# Patient Record
Sex: FEMALE | Race: WHITE | NOT HISPANIC OR LATINO | URBAN - METROPOLITAN AREA
[De-identification: names, ages, dates, MRNs, and addresses within clinical notes are randomized per-mention and may not be internally consistent; named-entity substitution may affect disease eponyms.]

---

## 2022-09-08 ENCOUNTER — OFFICE VISIT (OUTPATIENT)
Dept: UROLOGY | Facility: CLINIC | Age: 42
End: 2022-09-08
Payer: COMMERCIAL

## 2022-09-08 VITALS
HEIGHT: 61 IN | BODY MASS INDEX: 23.41 KG/M2 | SYSTOLIC BLOOD PRESSURE: 141 MMHG | HEART RATE: 95 BPM | DIASTOLIC BLOOD PRESSURE: 74 MMHG | WEIGHT: 124 LBS

## 2022-09-08 DIAGNOSIS — N30.10 IC (INTERSTITIAL CYSTITIS): Primary | ICD-10-CM

## 2022-09-08 PROCEDURE — 99203 PR OFFICE/OUTPT VISIT, NEW, LEVL III, 30-44 MIN: ICD-10-PCS | Mod: S$GLB,,, | Performed by: UROLOGY

## 2022-09-08 PROCEDURE — 3077F PR MOST RECENT SYSTOLIC BLOOD PRESSURE >= 140 MM HG: ICD-10-PCS | Mod: CPTII,S$GLB,, | Performed by: UROLOGY

## 2022-09-08 PROCEDURE — 1159F PR MEDICATION LIST DOCUMENTED IN MEDICAL RECORD: ICD-10-PCS | Mod: CPTII,S$GLB,, | Performed by: UROLOGY

## 2022-09-08 PROCEDURE — 3077F SYST BP >= 140 MM HG: CPT | Mod: CPTII,S$GLB,, | Performed by: UROLOGY

## 2022-09-08 PROCEDURE — 3078F PR MOST RECENT DIASTOLIC BLOOD PRESSURE < 80 MM HG: ICD-10-PCS | Mod: CPTII,S$GLB,, | Performed by: UROLOGY

## 2022-09-08 PROCEDURE — 1160F PR REVIEW ALL MEDS BY PRESCRIBER/CLIN PHARMACIST DOCUMENTED: ICD-10-PCS | Mod: CPTII,S$GLB,, | Performed by: UROLOGY

## 2022-09-08 PROCEDURE — 4010F PR ACE/ARB THEARPY RXD/TAKEN: ICD-10-PCS | Mod: CPTII,S$GLB,, | Performed by: UROLOGY

## 2022-09-08 PROCEDURE — 1159F MED LIST DOCD IN RCRD: CPT | Mod: CPTII,S$GLB,, | Performed by: UROLOGY

## 2022-09-08 PROCEDURE — 3008F PR BODY MASS INDEX (BMI) DOCUMENTED: ICD-10-PCS | Mod: CPTII,S$GLB,, | Performed by: UROLOGY

## 2022-09-08 PROCEDURE — 4010F ACE/ARB THERAPY RXD/TAKEN: CPT | Mod: CPTII,S$GLB,, | Performed by: UROLOGY

## 2022-09-08 PROCEDURE — 1160F RVW MEDS BY RX/DR IN RCRD: CPT | Mod: CPTII,S$GLB,, | Performed by: UROLOGY

## 2022-09-08 PROCEDURE — 99203 OFFICE O/P NEW LOW 30 MIN: CPT | Mod: S$GLB,,, | Performed by: UROLOGY

## 2022-09-08 PROCEDURE — 3008F BODY MASS INDEX DOCD: CPT | Mod: CPTII,S$GLB,, | Performed by: UROLOGY

## 2022-09-08 PROCEDURE — 3078F DIAST BP <80 MM HG: CPT | Mod: CPTII,S$GLB,, | Performed by: UROLOGY

## 2022-09-08 RX ORDER — LEVOTHYROXINE SODIUM 100 UG/1
100 TABLET ORAL DAILY
COMMUNITY
Start: 2022-08-26

## 2022-09-08 RX ORDER — NITROFURANTOIN MACROCRYSTALS 50 MG/1
100 CAPSULE ORAL NIGHTLY
Qty: 30 CAPSULE | Refills: 11 | Status: SHIPPED | OUTPATIENT
Start: 2022-09-08 | End: 2023-07-27

## 2022-09-08 RX ORDER — AMOXICILLIN 500 MG
CAPSULE ORAL DAILY
COMMUNITY

## 2022-09-08 RX ORDER — DEXTROAMPHETAMINE SACCHARATE, AMPHETAMINE ASPARTATE, DEXTROAMPHETAMINE SULFATE AND AMPHETAMINE SULFATE 5; 5; 5; 5 MG/1; MG/1; MG/1; MG/1
TABLET ORAL
COMMUNITY
Start: 2022-08-01

## 2022-09-08 RX ORDER — BUPROPION HYDROCHLORIDE 300 MG/1
TABLET ORAL
COMMUNITY
Start: 2022-09-06

## 2022-09-08 RX ORDER — NORGESTIMATE AND ETHINYL ESTRADIOL 0.25-0.035
1 KIT ORAL DAILY
COMMUNITY
Start: 2022-08-26 | End: 2023-11-14 | Stop reason: SDUPTHER

## 2022-09-08 RX ORDER — LISINOPRIL 2.5 MG/1
2.5 TABLET ORAL DAILY
COMMUNITY
Start: 2022-07-11

## 2022-09-08 RX ORDER — MONTELUKAST SODIUM 10 MG/1
TABLET ORAL
COMMUNITY
Start: 2022-07-12

## 2022-09-08 RX ORDER — CLONAZEPAM 0.5 MG/1
TABLET ORAL
COMMUNITY
Start: 2022-05-02 | End: 2023-11-14

## 2022-09-08 NOTE — PROGRESS NOTES
Subjective:       Patient ID: Chelsea Damico is a 42 y.o. female.    Chief Complaint: IC      HPI:  42-year-old female with a long history of IC she has tried [place] cystoscopy with hydrodistention and bladder installations she was a former patient of Dr. Arti barrera per room    Past Medical History:   Past Medical History:   Diagnosis Date    Thoracic outlet syndrome        Past Surgical Historical:   Past Surgical History:   Procedure Laterality Date    APPENDECTOMY      CARPAL TUNNEL RELEASE          Medications:   Medication List with Changes/Refills   Current Medications    BUPROPION (WELLBUTRIN XL) 300 MG 24 HR TABLET        CLONAZEPAM (KLONOPIN) 0.5 MG TABLET    TAKE 1 TABLET ORALLY EVERY DAY    CRANBERRY XT/MULTIVITAMIN (CRANBERRY EXTRACT-MULTIVITAMIN ORAL)    Take by mouth.    DEXTROAMPHETAMINE-AMPHETAMINE (ADDERALL) 20 MG TABLET    TAKE 1 TABLET ORALLY TWICE DAILY    ESTARYLLA 0.25-35 MG-MCG PER TABLET    Take 1 tablet by mouth once daily.    LISINOPRIL (PRINIVIL,ZESTRIL) 2.5 MG TABLET    Take 2.5 mg by mouth once daily.    MONTELUKAST (SINGULAIR) 10 MG TABLET    TAKE 1 TABLET ORALLY EVERY DAY    OMEGA-3 FATTY ACIDS/FISH OIL (FISH OIL-OMEGA-3 FATTY ACIDS) 300-1,000 MG CAPSULE    Take by mouth once daily.    SYNTHROID 100 MCG TABLET    Take 100 mcg by mouth once daily.    VITAMIN B COMP AND VIT C NO.6 (VITAMIN B COMP WITH VIT C NO.6 ORAL)    Take by mouth.        Past Social History:   Social History     Socioeconomic History    Marital status:    Tobacco Use    Smoking status: Never    Smokeless tobacco: Never   Substance and Sexual Activity    Alcohol use: Yes       Allergies: Review of patient's allergies indicates:  No Known Allergies     Family History: History reviewed. No pertinent family history.     Review of Systems:  Review of Systems    Physical Exam:  Physical Exam    Assessment/Plan:       Problem List Items Addressed This Visit    None  Visit Diagnoses       IC (interstitial cystitis)    -   Primary               IC:   We will set the patient up for as needed bladder instillations

## 2022-10-05 ENCOUNTER — TELEPHONE (OUTPATIENT)
Dept: UROLOGY | Facility: CLINIC | Age: 42
End: 2022-10-05
Payer: COMMERCIAL

## 2022-10-07 ENCOUNTER — CLINICAL SUPPORT (OUTPATIENT)
Dept: UROLOGY | Facility: CLINIC | Age: 42
End: 2022-10-07
Payer: COMMERCIAL

## 2022-10-07 DIAGNOSIS — N30.10 IC (INTERSTITIAL CYSTITIS): Primary | ICD-10-CM

## 2022-10-07 NOTE — LETTER
October 7, 2022    Chelsea Damico  [address] [facility] [address]             [place] [facility] [place] - U[facility]  Urology  [address] [address]  Phone: [phone]  Fax: [phone]   October 7, 2022     Patient: Chelsea Damico   YOB: 1980   Date of Visit: 10/7/2022       To Whom it May Concern:    Chelsea Damico was seen in my clinic on 10/7/2022. She may return to work on 10/7/2022.    Please excuse her from any work missed.    If you have any questions or concerns, please don't hesitate to call.    Sincerely,         Kaelyn Morejon LPN

## 2022-10-07 NOTE — PROGRESS NOTES
Cleansed urethra with Hibiclense. Inserted 14 Georgian red catheter into patient's bladder and drained the bladder. Step #1 60cc Sterile H2O, (Heparin 1,000units) and Dexamethasone 8mg) instilled into bladder via catheter, slow push. Catheter held in place with plug. Patient was instructed to hold and rotate every 10 minutes for 30 minutes. After draining step #1 from bladder step #2 (DMSO irrigation 50ml mixed with 15ml 2% lidocain instilled into bladder via slow push. Catheter was discontinued. Patient was instructed to hold instillation for 20 minutes then completely empty bladder . Patient tolerated procedure without complaints. Pt verbalized understanding of post instructions.

## 2022-10-14 ENCOUNTER — CLINICAL SUPPORT (OUTPATIENT)
Dept: UROLOGY | Facility: CLINIC | Age: 42
End: 2022-10-14
Payer: COMMERCIAL

## 2022-10-14 DIAGNOSIS — N30.10 IC (INTERSTITIAL CYSTITIS): Primary | ICD-10-CM

## 2022-10-14 NOTE — LETTER
October 14, 2022      [place] ([facility]) - Urology  [address] [address]  Phone: [phone]  Fax: [phone]       Patient: Chelsea Damico   YOB: 1980  Date of Visit: 10/14/2022    To Whom It May Concern:    Vincent Damico  was at [facility] on 10/14/2022. The patient may return to work/school 10/14/2022 with no restrictions. If you have any questions or concerns, or if I can be of further assistance, please do not hesitate to contact me.    Sincerely,    Melany Palafox RN

## 2022-10-14 NOTE — PROGRESS NOTES
U/A done for possible UTI.  SCOT Mcmanus LPN discussed U/A results with pt  Will culture urine.  Decision made to continue with treatment and pt agreed.    Treatment #2 - Pt sam area cleaned with Hibiclens. 14 FR red catheter placed without difficulty.  Bladder drained of yellow/orange urine - 150cc.      Step 1 - 60 cc syringe of Heparin/Dexamethasone mix instilled slowly. Catheter plugged and secured with tape to inner thigh. Pt instructed to turn every 10 minutes.. back to left to right.  Upon completion of 30 minutes, catheter unplugged and bladder drained.    Step 2 - RIMSO/2% Lidocaine mixture instilled slowly with piston syringe.  Catheter removed with tip intact. Pt instructed to let dwell for 20 minutes then empty bladder. Pt acknowledges understanding.  Treatment #3 scheduled for next week    Pt tolerated well.

## 2022-10-16 LAB — URINE CULTURE, ROUTINE: NORMAL

## 2022-10-19 ENCOUNTER — TELEPHONE (OUTPATIENT)
Dept: UROLOGY | Facility: CLINIC | Age: 42
End: 2022-10-19
Payer: COMMERCIAL

## 2022-10-19 NOTE — TELEPHONE ENCOUNTER
LEFT VM WITH RESULTS OF NO INFECTION.    ----- Message from Blaze Lambert MD sent at 10/17/2022 10:04 AM CDT -----  Culture shows no growth. Ask pt if symptomatic.   
No pertinent past medical history

## 2022-10-21 ENCOUNTER — CLINICAL SUPPORT (OUTPATIENT)
Dept: UROLOGY | Facility: CLINIC | Age: 42
End: 2022-10-21
Payer: COMMERCIAL

## 2022-10-21 DIAGNOSIS — R82.90 ABNORMAL URINALYSIS: Primary | ICD-10-CM

## 2022-10-21 DIAGNOSIS — R82.90 ABNORMAL URINALYSIS: ICD-10-CM

## 2022-10-21 DIAGNOSIS — N30.10 IC (INTERSTITIAL CYSTITIS): Primary | ICD-10-CM

## 2022-10-21 RX ORDER — LEVOFLOXACIN 500 MG/1
500 TABLET, FILM COATED ORAL DAILY
Qty: 7 TABLET | Refills: 0 | Status: SHIPPED | OUTPATIENT
Start: 2022-10-21 | End: 2023-07-27

## 2022-10-21 NOTE — PROGRESS NOTES
Pt here for DMSO #3.  Pt states she has been urinating more and and feeling like she has a possible UTI.  U/A done.  + for UTI.  Medication called out and urine cultured.  Procedure cancelled for this week.  Rescheduled for next Friday at 0910.

## 2022-10-21 NOTE — LETTER
October 21, 2022      [place] ([facility]) - Urology  [address] [address]  Phone: [phone]  Fax: [phone]       Patient: Chelsea Damico   YOB: 1980  Date of Visit: 10/21/2022    To Whom It May Concern:    Vincent Damico  was at [facility] on 10/21/2022. The patient may return to work/school on *** {With/no:82328} restrictions. If you have any questions or concerns, or if I can be of further assistance, please do not hesitate to contact me.    Sincerely,    Blaze Lambert MD

## 2022-10-23 LAB — URINE CULTURE, ROUTINE: NORMAL

## 2022-10-26 ENCOUNTER — TELEPHONE (OUTPATIENT)
Dept: UROLOGY | Facility: CLINIC | Age: 42
End: 2022-10-26
Payer: COMMERCIAL

## 2022-10-26 NOTE — TELEPHONE ENCOUNTER
Notified patient of no growth on urine culture & to follow up as needed. Verbalized understanding.     JUDD London RN        ----- Message from Estefanía Damico NP sent at 10/25/2022  9:29 AM CDT -----  Please call the patient with the attached results. No growth found in culture. Follow up PRN.

## 2022-11-04 ENCOUNTER — CLINICAL SUPPORT (OUTPATIENT)
Dept: UROLOGY | Facility: CLINIC | Age: 42
End: 2022-11-04
Payer: COMMERCIAL

## 2022-11-04 NOTE — PROGRESS NOTES
Pt here for DMSO treatment #4.  Pt denies any signs/symptoms of UTI.  Pt sam area cleaned with Hibiclens.  14 FR red catheter placed without difficulty.  Bladder drained of 200 cc yellow urine.  Step 1:  Verification from pt of correct name on syringe.  60 CC syringe of Heparin/Dexamethasone mix instilled slowly.  Catheter plugged and secured with tape to inner left thigh. Pt instructed to change position from back to left side to right side every 10 minutes. Upon completion of 30 minutes, catheter plug removed and bladder drained.  Step 2: RIMSO/2% Lidocaine gel mixture mixed and slowly instilled via catheter with piston syringe.  Catheter removed with tip in tact. Pt instructed to let mixture dwell for 20 minutes but no longer than 30 minutes then empty bladder.  Pt acknowledges understanding. Treatment #5 scheduled for next week.

## 2022-11-04 NOTE — LETTER
November 4, 2022      [place] ([facility]) - Urology  [address] [address]  Phone: [phone]  Fax: [phone]       Patient: Chelsea Damico   YOB: 1980  Date of Visit: 11/04/2022    To Whom It May Concern:    Vincent Damico  was at [facility] on 11/04/2022. The patient may return to work/school on 11/04/2022 with no restrictions. If you have any questions or concerns, or if I can be of further assistance, please do not hesitate to contact me.    Sincerely,    Melany Palafox RN

## 2022-11-18 ENCOUNTER — CLINICAL SUPPORT (OUTPATIENT)
Dept: UROLOGY | Facility: CLINIC | Age: 42
End: 2022-11-18
Payer: COMMERCIAL

## 2022-11-18 NOTE — LETTER
November 18, 2022      [place] ([facility]) - Urology  [address] [address]  Phone: [phone]  Fax: [phone]       Patient: Chelsea Damico   YOB: 1980  Date of Visit: 11/18/2022    To Whom It May Concern:    Vincent Damico  was at [facility] on 11/18/2022. The patient may return to work/school on 11/18/2022 with no restrictions. If you have any questions or concerns, or if I can be of further assistance, please do not hesitate to contact me.    Sincerely,    Melany Palafox RN

## 2022-11-18 NOTE — PROGRESS NOTES
Pt here for DMSO treatment #5.  Pt denies any signs/symptoms of UTI.  Pt sam area cleaned with Hibiclens.  14 FR red catheter placed without difficulty.  Bladder drained of 225 cc yellow urine.  Step 1:  Verification from pt of correct name on syringe.  60 CC syringe of Heparin/Dexamethasone mix instilled slowly.  Catheter plugged and secured with tape to inner left thigh. Pt instructed to change position from back to left side to right side every 10 minutes. Upon completion of 30 minutes, catheter plug removed and bladder drained.  Step 2: RIMSO/2% Lidocaine gel mixture mixed and slowly instilled via catheter with piston syringe.  Catheter removed with tip in tact. Pt instructed to let mixture dwell for 20 minutes but no longer than 30 minutes then empty bladder.  Pt acknowledges understanding. Treatment #6 scheduled for week after next.

## 2022-12-02 ENCOUNTER — CLINICAL SUPPORT (OUTPATIENT)
Dept: UROLOGY | Facility: CLINIC | Age: 42
End: 2022-12-02
Payer: COMMERCIAL

## 2022-12-02 NOTE — LETTER
December 2, 2022      [place] ([facility]) - Urology  [address] [address]  Phone: [phone]  Fax: [phone]       Patient: Chelsea Damico   YOB: 1980  Date of Visit: 12/02/2022    To Whom It May Concern:    Vincent Damico  was at [facility] on 12/02/2022. The patient may return to work/school on 12/2/2022 with no  restrictions. If you have any questions or concerns, or if I can be of further assistance, please do not hesitate to contact me.    Sincerely,    Megan London RN

## 2022-12-02 NOTE — PROGRESS NOTES
Patient here for DMSO treatment # 6. Patient denies any signs/symptoms of a UTI. Anjali-area cleansed with hibclens. 14 fr red catheter inserted without difficulty. Bladder drained of 300 cc yellow urine.    Step 1: Verification from patient of correct name &  on syringe. 60 cc syringe of heparin/dexamethasone mix instilled slowly into bladder. Catheter plugged & secured to left inner thigh with tape. Patient instructed to change position from back to left to right side every 10 minutes. Patient verbalized understanding. Upon completion of 30 minutes dwell time, catheter plug removed & bladder drained.     Step 2: RIMSO 2% Lidocaine gel 5% mixture mixed & slowly instilled via catheter with piston syringe. Catheter removed with tip intact. Patient instructed to allow mixture to dwell for 20 minutes but no longer than 30 minutes then empty bladder. Patient verbalized understanding. DMSO treatments complete.       JUDD London RN

## 2023-04-14 ENCOUNTER — TELEPHONE (OUTPATIENT)
Dept: OBSTETRICS AND GYNECOLOGY | Facility: CLINIC | Age: 43
End: 2023-04-14
Payer: COMMERCIAL

## 2023-05-10 ENCOUNTER — TELEPHONE (OUTPATIENT)
Dept: OBSTETRICS AND GYNECOLOGY | Facility: CLINIC | Age: 43
End: 2023-05-10
Payer: COMMERCIAL

## 2023-05-17 ENCOUNTER — TELEPHONE (OUTPATIENT)
Dept: OBSTETRICS AND GYNECOLOGY | Facility: CLINIC | Age: 43
End: 2023-05-17
Payer: COMMERCIAL

## 2023-07-20 DIAGNOSIS — Z01.419 ENCOUNTER FOR ANNUAL ROUTINE GYNECOLOGICAL EXAMINATION: Primary | ICD-10-CM

## 2023-07-26 DIAGNOSIS — N30.10 IC (INTERSTITIAL CYSTITIS): ICD-10-CM

## 2023-07-27 RX ORDER — NITROFURANTOIN MACROCRYSTALS 50 MG/1
100 CAPSULE ORAL NIGHTLY
Qty: 30 CAPSULE | Refills: 1 | Status: SHIPPED | OUTPATIENT
Start: 2023-07-27 | End: 2023-08-15 | Stop reason: SDUPTHER

## 2023-08-15 DIAGNOSIS — N30.10 IC (INTERSTITIAL CYSTITIS): ICD-10-CM

## 2023-08-15 RX ORDER — NITROFURANTOIN MACROCRYSTALS 50 MG/1
100 CAPSULE ORAL NIGHTLY
Qty: 30 CAPSULE | Refills: 1 | Status: SHIPPED | OUTPATIENT
Start: 2023-08-15 | End: 2023-11-14

## 2023-09-01 ENCOUNTER — PATIENT MESSAGE (OUTPATIENT)
Dept: OBSTETRICS AND GYNECOLOGY | Facility: CLINIC | Age: 43
End: 2023-09-01
Payer: COMMERCIAL

## 2023-09-20 ENCOUNTER — OFFICE VISIT (OUTPATIENT)
Dept: UROLOGY | Facility: CLINIC | Age: 43
End: 2023-09-20
Payer: COMMERCIAL

## 2023-09-20 DIAGNOSIS — N30.10 INTERSTITIAL CYSTITIS: Primary | ICD-10-CM

## 2023-09-20 LAB
BILIRUBIN, UA POC OHS: NEGATIVE
BLOOD, UA POC OHS: NEGATIVE
CLARITY, UA POC OHS: CLEAR
COLOR, UA POC OHS: YELLOW
GLUCOSE, UA POC OHS: NEGATIVE
KETONES, UA POC OHS: NEGATIVE
LEUKOCYTES, UA POC OHS: NEGATIVE
NITRITE, UA POC OHS: NEGATIVE
PH, UA POC OHS: 6
PROTEIN, UA POC OHS: NEGATIVE
SPECIFIC GRAVITY, UA POC OHS: 1.02
UROBILINOGEN, UA POC OHS: 0.2

## 2023-09-20 PROCEDURE — 81003 POCT URINALYSIS(INSTRUMENT): ICD-10-PCS | Mod: QW,S$GLB,, | Performed by: UROLOGY

## 2023-09-20 PROCEDURE — 1159F MED LIST DOCD IN RCRD: CPT | Mod: CPTII,S$GLB,, | Performed by: UROLOGY

## 2023-09-20 PROCEDURE — 99214 PR OFFICE/OUTPT VISIT, EST, LEVL IV, 30-39 MIN: ICD-10-PCS | Mod: S$GLB,,, | Performed by: UROLOGY

## 2023-09-20 PROCEDURE — 4010F PR ACE/ARB THEARPY RXD/TAKEN: ICD-10-PCS | Mod: CPTII,S$GLB,, | Performed by: UROLOGY

## 2023-09-20 PROCEDURE — 81003 URINALYSIS AUTO W/O SCOPE: CPT | Mod: QW,S$GLB,, | Performed by: UROLOGY

## 2023-09-20 PROCEDURE — 1159F PR MEDICATION LIST DOCUMENTED IN MEDICAL RECORD: ICD-10-PCS | Mod: CPTII,S$GLB,, | Performed by: UROLOGY

## 2023-09-20 PROCEDURE — 99214 OFFICE O/P EST MOD 30 MIN: CPT | Mod: S$GLB,,, | Performed by: UROLOGY

## 2023-09-20 PROCEDURE — 1160F PR REVIEW ALL MEDS BY PRESCRIBER/CLIN PHARMACIST DOCUMENTED: ICD-10-PCS | Mod: CPTII,S$GLB,, | Performed by: UROLOGY

## 2023-09-20 PROCEDURE — 1160F RVW MEDS BY RX/DR IN RCRD: CPT | Mod: CPTII,S$GLB,, | Performed by: UROLOGY

## 2023-09-20 PROCEDURE — 4010F ACE/ARB THERAPY RXD/TAKEN: CPT | Mod: CPTII,S$GLB,, | Performed by: UROLOGY

## 2023-09-20 RX ORDER — NITROFURANTOIN 25; 75 MG/1; MG/1
100 CAPSULE ORAL DAILY
Qty: 30 CAPSULE | Refills: 11 | Status: SHIPPED | OUTPATIENT
Start: 2023-09-20

## 2023-09-20 NOTE — PROGRESS NOTES
Subjective:       Patient ID: Chelsea Damico is a 43 y.o. female.    Chief Complaint: No chief complaint on file.      HPI:  43-year-old female with history of interstitial cystitis which has recurrent she is done fairly well for the past year but currently has some flare-ups what she feels related potentially to stress    Past Medical History:   Past Medical History:   Diagnosis Date    Thoracic outlet syndrome        Past Surgical Historical:   Past Surgical History:   Procedure Laterality Date    APPENDECTOMY      CARPAL TUNNEL RELEASE          Medications:   Medication List with Changes/Refills   Current Medications    BUPROPION (WELLBUTRIN XL) 300 MG 24 HR TABLET        CLONAZEPAM (KLONOPIN) 0.5 MG TABLET    TAKE 1 TABLET ORALLY EVERY DAY    CRANBERRY XT/MULTIVITAMIN (CRANBERRY EXTRACT-MULTIVITAMIN ORAL)    Take by mouth.    DEXTROAMPHETAMINE-AMPHETAMINE (ADDERALL) 20 MG TABLET    TAKE 1 TABLET ORALLY TWICE DAILY    ESTARYLLA 0.25-35 MG-MCG PER TABLET    Take 1 tablet by mouth once daily.    LISINOPRIL (PRINIVIL,ZESTRIL) 2.5 MG TABLET    Take 2.5 mg by mouth once daily.    MONTELUKAST (SINGULAIR) 10 MG TABLET    TAKE 1 TABLET ORALLY EVERY DAY    NITROFURANTOIN (MACRODANTIN) 50 MG CAPSULE    Take 2 capsules (100 mg total) by mouth nightly.    OMEGA-3 FATTY ACIDS/FISH OIL (FISH OIL-OMEGA-3 FATTY ACIDS) 300-1,000 MG CAPSULE    Take by mouth once daily.    SYNTHROID 100 MCG TABLET    Take 100 mcg by mouth once daily.    VITAMIN B COMP AND VIT C NO.6 (VITAMIN B COMP WITH VIT C NO.6 ORAL)    Take by mouth.        Past Social History:   Social History     Socioeconomic History    Marital status:    Tobacco Use    Smoking status: Never    Smokeless tobacco: Never   Substance and Sexual Activity    Alcohol use: Yes       Allergies: Review of patient's allergies indicates:  No Known Allergies     Family History: History reviewed. No pertinent family history.     Review of Systems:  Review of Systems   Constitutional:   Negative for activity change and appetite change.   HENT:  Negative for congestion and dental problem.    Eyes:  Negative for pain and discharge.   Respiratory:  Negative for chest tightness and shortness of breath.    Cardiovascular:  Negative for chest pain.   Gastrointestinal:  Negative for abdominal distention and abdominal pain.   Endocrine: Negative for cold intolerance, heat intolerance and polyuria.   Genitourinary:  Negative for decreased urine volume, difficulty urinating, dyspareunia, dysuria, enuresis, flank pain, frequency, genital sores, hematuria, menstrual problem, pelvic pain, urgency, vaginal bleeding, vaginal discharge and vaginal pain.   Musculoskeletal:  Negative for back pain and neck pain.   Skin:  Negative for color change and rash.   Allergic/Immunologic: Negative for immunocompromised state.   Neurological:  Negative for dizziness.   Hematological:  Negative for adenopathy.   Psychiatric/Behavioral:  Negative for agitation, behavioral problems and confusion.        Physical Exam:  Physical Exam  Constitutional:       Appearance: She is well-developed.   HENT:      Head: Normocephalic and atraumatic.      Right Ear: External ear normal.      Left Ear: External ear normal.   Eyes:      Conjunctiva/sclera: Conjunctivae normal.   Neck:      Vascular: No JVD.   Cardiovascular:      Rate and Rhythm: Normal rate and regular rhythm.   Pulmonary:      Effort: Pulmonary effort is normal. No respiratory distress.      Breath sounds: Normal breath sounds. No wheezing.   Abdominal:      General: There is no distension.      Palpations: Abdomen is soft.      Tenderness: There is no abdominal tenderness. There is no rebound.   Musculoskeletal:         General: Normal range of motion.      Cervical back: Normal range of motion.   Skin:     General: Skin is warm and dry.      Findings: No erythema or rash.   Neurological:      Mental Status: She is alert and oriented to person, place, and time.    Psychiatric:         Behavior: Behavior normal.         Assessment/Plan:       Problem List Items Addressed This Visit    None  Visit Diagnoses       Interstitial cystitis    -  Primary               Interstitial cystitis and recurrent UTIs:   We will refill the patient's nitrofurantoin 100 mg we will get her scheduled for DMSO instillation

## 2023-10-06 ENCOUNTER — CLINICAL SUPPORT (OUTPATIENT)
Dept: UROLOGY | Facility: CLINIC | Age: 43
End: 2023-10-06
Payer: COMMERCIAL

## 2023-10-06 DIAGNOSIS — N30.10 INTERSTITIAL CYSTITIS: Primary | ICD-10-CM

## 2023-10-06 PROCEDURE — 51700 PR IRRIGATION, BLADDER: ICD-10-PCS | Mod: S$GLB,,, | Performed by: UROLOGY

## 2023-10-06 PROCEDURE — 51700 IRRIGATION OF BLADDER: CPT | Mod: S$GLB,,, | Performed by: UROLOGY

## 2023-10-06 NOTE — PROGRESS NOTES
Pt here for DMSO treatment #1.  Pt denies any signs/symptoms of UTI.  Pt sam area cleaned with Hibiclens.  14 FR red catheter placed without difficulty.  Bladder drained of 75 cc yellow urine.  Step 1:  Verification from pt of correct name on syringe.  60 CC syringe of Heparin/Dexamethasone mix instilled slowly.  Catheter plugged and secured with tape to inner left thigh. Pt instructed to change position from back to left side to right side every 10 minutes. Upon completion of 30 minutes, catheter plug removed and bladder drained.  Step 2: RIMSO/2% Lidocaine gel mixture mixed and slowly instilled via catheter with piston syringe.  Catheter removed with tip in tact. Pt instructed to let mixture dwell for 20 minutes but no longer than 30 minutes then empty bladder.  Pt acknowledges understanding. Treatment #2 scheduled for next week.

## 2023-10-11 ENCOUNTER — TELEPHONE (OUTPATIENT)
Dept: UROLOGY | Facility: CLINIC | Age: 43
End: 2023-10-11
Payer: COMMERCIAL

## 2023-10-11 NOTE — TELEPHONE ENCOUNTER
----- Message from Yessica Mcmanus LPN sent at 10/11/2023  1:16 PM CDT -----  Contact: Chelsea    ----- Message -----  From: Serene Palafox MA  Sent: 10/11/2023  10:11 AM CDT  To: Yessica Mcmanus LPN; Melany Palafox RN      ----- Message -----  From: Betzaida Gomez  Sent: 10/11/2023  10:03 AM CDT  To: Fausto Thakur Staff    Pt is calling inm regards to getting her appt on 10/13 rescheduled to next Friday 10/20 same time.please call back at  [phone]                 Thanks  PAZ

## 2023-10-20 ENCOUNTER — CLINICAL SUPPORT (OUTPATIENT)
Dept: UROLOGY | Facility: CLINIC | Age: 43
End: 2023-10-20
Payer: COMMERCIAL

## 2023-10-20 DIAGNOSIS — N30.10 INTERSTITIAL CYSTITIS: Primary | ICD-10-CM

## 2023-10-20 PROCEDURE — 96372 PR INJECTION,THERAP/PROPH/DIAG2ST, IM OR SUBCUT: ICD-10-PCS | Mod: S$GLB,,, | Performed by: UROLOGY

## 2023-10-20 PROCEDURE — 96372 THER/PROPH/DIAG INJ SC/IM: CPT | Mod: S$GLB,,, | Performed by: UROLOGY

## 2023-10-20 NOTE — PROGRESS NOTES
Patient here for DMSO treatment # 2. Assisted patient onto exam table & feet placed in stirrups.   STEP 1: Anjali area cleansed using aseptic technique. 14 fr red in & out catheter inserted into urethra without difficulty. Immediate return of 150 cc of dark yellowish, orange urine to cylinder. Heparin 1000 units/Dexamethasone 8 mg mixture instilled into bladder via catheter slow push. Patient tolerated well. Catheter plugged. Instructed patient to turn from back to left to right every 10 minutes for a total of 30 minutes. Verbalized understanding.     STEP 2 : Catheter untaped & unplugged, bladder drained of 100 cc of yellowish-orange urine to cylinder. RIMSO 50 cc solution instilled into bladder via catheter slow push. 14 fr catheter removed with tip intact. Patient tolerated well. Instructed patient to allow medication to dwell in bladder for 20 minutes no longer than 30 minutes then empty bladder. Verbalized understanding.

## 2023-10-27 ENCOUNTER — CLINICAL SUPPORT (OUTPATIENT)
Dept: UROLOGY | Facility: CLINIC | Age: 43
End: 2023-10-27
Payer: COMMERCIAL

## 2023-10-27 DIAGNOSIS — N30.10 INTERSTITIAL CYSTITIS: Primary | ICD-10-CM

## 2023-10-27 PROCEDURE — 51700 IRRIGATION OF BLADDER: CPT | Mod: S$GLB,,, | Performed by: UROLOGY

## 2023-10-27 PROCEDURE — 51700 PR IRRIGATION, BLADDER: ICD-10-PCS | Mod: S$GLB,,, | Performed by: UROLOGY

## 2023-10-27 NOTE — PROGRESS NOTES
Pt here for DMSO treatment #3.  Pt denies any signs/symptoms of UTI.  Pt sam area cleaned with Hibiclens.  14 FR red catheter placed without difficulty.  Bladder drained of 50 cc bright yellow urine.  Step 1:  Verification from pt of correct name on syringe.  60 CC syringe of Heparin/Dexamethasone mix instilled slowly.  Catheter plugged and secured with tape to inner left thigh. Pt instructed to change position from back to left side to right side every 10 minutes. Upon completion of 30 minutes, catheter plug removed and bladder drained.  Step 2: RIMSO/2% Lidocaine gel mixture mixed and slowly instilled via catheter with piston syringe.  Catheter removed with tip in tact. Pt instructed to let mixture dwell for 20 minutes but no longer than 30 minutes then empty bladder.  Pt acknowledges understanding. Treatment #4 scheduled for 2 weeks

## 2023-11-10 ENCOUNTER — CLINICAL SUPPORT (OUTPATIENT)
Dept: UROLOGY | Facility: CLINIC | Age: 43
End: 2023-11-10
Payer: COMMERCIAL

## 2023-11-10 DIAGNOSIS — N30.10 INTERSTITIAL CYSTITIS: Primary | ICD-10-CM

## 2023-11-10 PROCEDURE — 51700 IRRIGATION OF BLADDER: CPT | Mod: S$GLB,,, | Performed by: UROLOGY

## 2023-11-10 PROCEDURE — 51700 PR IRRIGATION, BLADDER: ICD-10-PCS | Mod: S$GLB,,, | Performed by: UROLOGY

## 2023-11-10 NOTE — PROGRESS NOTES
Pt here for DMSO treatment #4.  Pt denies any signs/symptoms of UTI.  Pt sam area cleaned with Hibiclens.  14 FR red catheter placed without difficulty.  Bladder drained of 100 cc clear yellow urine.  Step 1:  Verification from pt of correct name on syringe.  60 CC syringe of Heparin/Dexamethasone mix instilled slowly.  Catheter plugged and secured with tape to inner right thigh. Pt instructed to change position from back to left side to right side every 10 minutes. Upon completion of 30 minutes, catheter plug removed and bladder drained of 125 cc yellow urine.  Step 2: RIMSO/2% Lidocaine gel mixture mixed and slowly instilled via catheter with piston syringe.  Catheter removed with tip in tact. Pt instructed to let mixture dwell for 20 minutes but no longer than 30 minutes then empty bladder.  Pt acknowledges understanding. Treatment #5 appt made for 1 week from today.

## 2023-11-14 ENCOUNTER — TELEPHONE (OUTPATIENT)
Dept: UROLOGY | Facility: CLINIC | Age: 43
End: 2023-11-14
Payer: COMMERCIAL

## 2023-11-14 ENCOUNTER — OFFICE VISIT (OUTPATIENT)
Dept: OBSTETRICS AND GYNECOLOGY | Facility: CLINIC | Age: 43
End: 2023-11-14
Payer: COMMERCIAL

## 2023-11-14 VITALS
BODY MASS INDEX: 25.04 KG/M2 | DIASTOLIC BLOOD PRESSURE: 98 MMHG | SYSTOLIC BLOOD PRESSURE: 156 MMHG | HEART RATE: 112 BPM | WEIGHT: 132.63 LBS | HEIGHT: 61 IN

## 2023-11-14 DIAGNOSIS — Z00.00 ENCOUNTER FOR WELLNESS EXAMINATION: Primary | ICD-10-CM

## 2023-11-14 DIAGNOSIS — N30.10 INTERSTITIAL CYSTITIS: Primary | ICD-10-CM

## 2023-11-14 DIAGNOSIS — R19.00 PELVIC FULLNESS IN FEMALE: ICD-10-CM

## 2023-11-14 PROCEDURE — 1159F MED LIST DOCD IN RCRD: CPT | Mod: CPTII,S$GLB,, | Performed by: OBSTETRICS & GYNECOLOGY

## 2023-11-14 PROCEDURE — 3008F PR BODY MASS INDEX (BMI) DOCUMENTED: ICD-10-PCS | Mod: CPTII,S$GLB,, | Performed by: OBSTETRICS & GYNECOLOGY

## 2023-11-14 PROCEDURE — 3008F BODY MASS INDEX DOCD: CPT | Mod: CPTII,S$GLB,, | Performed by: OBSTETRICS & GYNECOLOGY

## 2023-11-14 PROCEDURE — 3080F DIAST BP >= 90 MM HG: CPT | Mod: CPTII,S$GLB,, | Performed by: OBSTETRICS & GYNECOLOGY

## 2023-11-14 PROCEDURE — 3080F PR MOST RECENT DIASTOLIC BLOOD PRESSURE >= 90 MM HG: ICD-10-PCS | Mod: CPTII,S$GLB,, | Performed by: OBSTETRICS & GYNECOLOGY

## 2023-11-14 PROCEDURE — 4010F PR ACE/ARB THEARPY RXD/TAKEN: ICD-10-PCS | Mod: CPTII,S$GLB,, | Performed by: OBSTETRICS & GYNECOLOGY

## 2023-11-14 PROCEDURE — 4010F ACE/ARB THERAPY RXD/TAKEN: CPT | Mod: CPTII,S$GLB,, | Performed by: OBSTETRICS & GYNECOLOGY

## 2023-11-14 PROCEDURE — 1159F PR MEDICATION LIST DOCUMENTED IN MEDICAL RECORD: ICD-10-PCS | Mod: CPTII,S$GLB,, | Performed by: OBSTETRICS & GYNECOLOGY

## 2023-11-14 PROCEDURE — 3077F PR MOST RECENT SYSTOLIC BLOOD PRESSURE >= 140 MM HG: ICD-10-PCS | Mod: CPTII,S$GLB,, | Performed by: OBSTETRICS & GYNECOLOGY

## 2023-11-14 PROCEDURE — 99386 PR PREVENTIVE VISIT,NEW,40-64: ICD-10-PCS | Mod: S$GLB,,, | Performed by: OBSTETRICS & GYNECOLOGY

## 2023-11-14 PROCEDURE — 99386 PREV VISIT NEW AGE 40-64: CPT | Mod: S$GLB,,, | Performed by: OBSTETRICS & GYNECOLOGY

## 2023-11-14 PROCEDURE — 3077F SYST BP >= 140 MM HG: CPT | Mod: CPTII,S$GLB,, | Performed by: OBSTETRICS & GYNECOLOGY

## 2023-11-14 RX ORDER — NORGESTIMATE AND ETHINYL ESTRADIOL 0.25-0.035
1 KIT ORAL DAILY
Qty: 84 TABLET | Refills: 3 | Status: SHIPPED | OUTPATIENT
Start: 2023-11-14

## 2023-11-14 NOTE — TELEPHONE ENCOUNTER
----- Message from Mary Beth Garcia sent at 11/14/2023  9:23 AM CST -----  Contact: self  Type: Staff Message  Caller: Chelsea Damico  Call Back Number: [phone]  Nature of the Call: pt has a  UTI and needing to cancel apt  Additional Information: na

## 2023-11-14 NOTE — PROGRESS NOTES
Subjective:      Patient ID: Chelsea Damico is a 43 y.o. female.    Chief Complaint:  Well Woman      History of Present Illness  Gynecologic Exam  The patient's primary symptoms include pelvic pain. The patient's pertinent negatives include no genital itching, genital lesions, genital odor, genital rash, missed menses, vaginal bleeding or vaginal discharge. She is not pregnant. Associated symptoms include headaches. Pertinent negatives include no abdominal pain, anorexia, back pain, chills, constipation, diarrhea, discolored urine, dysuria, fever, flank pain, frequency, hematuria, nausea, painful intercourse, rash, urgency or vomiting. There has been no bleeding. She has not been passing clots. She has not been passing tissue. She is sexually active. No, her partner does not have an STD. She uses oral contraceptives for contraception. Her menstrual history has been irregular. Her past medical history is significant for an abdominal surgery, endometriosis, a gynecological surgery, metrorrhagia and ovarian cysts. There is no history of a  section, an ectopic pregnancy, herpes simplex, menorrhagia, miscarriage, perineal abscess, PID, an STD, a terminated pregnancy or vaginosis.     This is a 43 year old female here for her annual exam and has no additional complaints      GYN & OB History  No LMP recorded (lmp unknown).   Date of Last Pap: No result found    OB History    Para Term  AB Living   2       2     SAB IAB Ectopic Multiple Live Births   2              # Outcome Date GA Lbr Jaron/2nd Weight Sex Delivery Anes PTL Lv   2 2005           1 2003               Review of Systems  Review of Systems   Constitutional:  Negative for chills and fever.   Gastrointestinal:  Negative for abdominal pain, anorexia, constipation, diarrhea, nausea and vomiting.   Genitourinary:  Positive for pelvic pain. Negative for dysuria, flank pain, frequency, hematuria, menorrhagia, missed menses, urgency and  vaginal discharge.   Musculoskeletal:  Negative for back pain.   Integumentary:  Negative for rash.   Neurological:  Positive for headaches.          Objective:     Physical Exam:   Constitutional: She appears well-developed and well-nourished.      Neck: No thyroid mass and no thyromegaly present.     Pulmonary/Chest: Chest wall is not dull to percussion. She exhibits no mass, no tenderness, no bony tenderness, no laceration, no crepitus, no edema, no deformity, no swelling and no retraction. Right breast exhibits no inverted nipple, no mass, no nipple discharge, no skin change, no tenderness, presence, no bleeding and no swelling. Left breast exhibits no inverted nipple, no mass, no nipple discharge, no skin change, no tenderness, presence, no bleeding and no swelling.        Abdominal: Soft. Bowel sounds are normal. There is no abdominal tenderness. Hernia confirmed negative in the right inguinal area and confirmed negative in the left inguinal area.     Genitourinary:    Vagina and uterus normal.      Pelvic exam was performed with patient supine.   Labial bartholins normal.There is no rash, tenderness, lesion or injury on the right labia. There is no rash, tenderness, lesion or injury on the left labia. Cervix is normal. Right adnexum displays fullness. Right adnexum displays no mass and no tenderness. Left adnexum displays fullness. Left adnexum displays no mass and no tenderness. No rectocele, cystocele or unspecified prolapse of vaginal walls in the vagina.                Skin: Skin is warm and dry.    Psychiatric: She has a normal mood and affect. Her speech is normal and behavior is normal.    Chaperone present        Assessment:     1. Encounter for wellness examination    2. Pelvic fullness in female              Plan:     Encounter for wellness examination    Pelvic fullness in female  -     Cancel: US OB/GYN Procedure ([facility]); Future  -     US Pelvis Comp with Transvag NON-OB (xpd; Future; Expected  date: 11/14/2023    Other orders  -     ESTARYLLA 0.25-35 mg-mcg per tablet; Take 1 tablet by mouth once daily.  Dispense: 84 tablet; Refill: 3

## 2023-11-16 ENCOUNTER — PATIENT MESSAGE (OUTPATIENT)
Dept: OBSTETRICS AND GYNECOLOGY | Facility: CLINIC | Age: 43
End: 2023-11-16
Payer: COMMERCIAL

## 2023-11-17 ENCOUNTER — CLINICAL SUPPORT (OUTPATIENT)
Dept: UROLOGY | Facility: CLINIC | Age: 43
End: 2023-11-17
Payer: COMMERCIAL

## 2023-11-17 DIAGNOSIS — N30.10 IC (INTERSTITIAL CYSTITIS): Primary | ICD-10-CM

## 2023-11-17 DIAGNOSIS — R30.0 DYSURIA: ICD-10-CM

## 2023-11-17 PROCEDURE — 51700 IRRIGATION OF BLADDER: CPT | Mod: S$GLB,,, | Performed by: UROLOGY

## 2023-11-17 PROCEDURE — 81003 POCT URINALYSIS(INSTRUMENT): ICD-10-PCS | Mod: QW,S$GLB,, | Performed by: UROLOGY

## 2023-11-17 PROCEDURE — 51700 PR IRRIGATION, BLADDER: ICD-10-PCS | Mod: S$GLB,,, | Performed by: UROLOGY

## 2023-11-17 PROCEDURE — 81003 URINALYSIS AUTO W/O SCOPE: CPT | Mod: QW,S$GLB,, | Performed by: UROLOGY

## 2023-11-17 NOTE — LETTER
[place] - [facility]rology  [address] [address]  Phone: [phone]  Fax: [phone]      2023      Chelsea Damico   1980    To whom it may concern:    Chelsea Damico was at [facility] today for a treatment. Please excuse her from work. She may return to work 23 when treatment is completed without any restrictions.  Feel free to contact us with any questions.    Thank you,      LILIA Camacho  Office Of Dr. Blaze Lambert

## 2023-11-17 NOTE — PROGRESS NOTES
Pt reports she thought she had an uti, saw ob/gyn doctor recently and they were going to check it. She had not heard back from anyone. Nothing found in chart.  Using aseptic technique, room and pt prepared for RIMSO treatment but I will get ua first. Apox 70cc orange fluid drained from bladder with 30 in ua cup.    UA came back with no bacteria nor any jayshree, but wbc 0-2. Will proceed with treatment.    Drained apox another 35cc orange fluid from bladder and instilled Heparin 1000 units/Dexamethasone 8mg in 60cc sterile compound solution. Time noted 2:12PM. Pt knows to turn q10 minutes. I will return and check on pt before draining part 1.    Checked on pt about 15 minutes into treatment, doing well. She requested work excuse and schedule next treatment. Done.    At 2:42PM I was in room with pt, drained part 1 then instilled the 50ml RIMSO lot # [id number] exp Sep 2025. 14F ramires removed. Pt tolerated well. After pt dressed she was given printed appointment and work excuse. Room cleaned.

## 2023-11-20 LAB
BILIRUBIN, UA POC OHS: NEGATIVE
BLOOD, UA POC OHS: NEGATIVE
CLARITY, UA POC OHS: CLEAR
COLOR, UA POC OHS: YELLOW
GLUCOSE, UA POC OHS: 100
KETONES, UA POC OHS: NEGATIVE
LEUKOCYTES, UA POC OHS: NEGATIVE
NITRITE, UA POC OHS: POSITIVE
PH, UA POC OHS: 6.5
PROTEIN, UA POC OHS: NEGATIVE
SPECIFIC GRAVITY, UA POC OHS: 1.01
UROBILINOGEN, UA POC OHS: 1

## 2023-11-21 ENCOUNTER — PATIENT MESSAGE (OUTPATIENT)
Dept: OBSTETRICS AND GYNECOLOGY | Facility: CLINIC | Age: 43
End: 2023-11-21
Payer: COMMERCIAL

## 2023-11-28 ENCOUNTER — TELEPHONE (OUTPATIENT)
Dept: UROLOGY | Facility: CLINIC | Age: 43
End: 2023-11-28
Payer: COMMERCIAL

## 2023-12-01 ENCOUNTER — CLINICAL SUPPORT (OUTPATIENT)
Dept: UROLOGY | Facility: CLINIC | Age: 43
End: 2023-12-01
Payer: COMMERCIAL

## 2023-12-01 DIAGNOSIS — N30.10 IC (INTERSTITIAL CYSTITIS): Primary | ICD-10-CM

## 2023-12-01 LAB
BILIRUBIN, UA POC OHS: NEGATIVE
BLOOD, UA POC OHS: NEGATIVE
CLARITY, UA POC OHS: CLEAR
COLOR, UA POC OHS: YELLOW
GLUCOSE, UA POC OHS: NEGATIVE
KETONES, UA POC OHS: NEGATIVE
LEUKOCYTES, UA POC OHS: NEGATIVE
NITRITE, UA POC OHS: NEGATIVE
PH, UA POC OHS: 6.5
PROTEIN, UA POC OHS: NEGATIVE
SPECIFIC GRAVITY, UA POC OHS: 1.01
UROBILINOGEN, UA POC OHS: 0.2

## 2023-12-01 PROCEDURE — 81003 URINALYSIS AUTO W/O SCOPE: CPT | Mod: QW,S$GLB,, | Performed by: FAMILY MEDICINE

## 2023-12-01 PROCEDURE — 51700 IRRIGATION OF BLADDER: CPT | Mod: S$GLB,,, | Performed by: UROLOGY

## 2023-12-01 PROCEDURE — 81003 POCT URINALYSIS(INSTRUMENT): ICD-10-PCS | Mod: QW,S$GLB,, | Performed by: FAMILY MEDICINE

## 2023-12-01 PROCEDURE — 51700 PR IRRIGATION, BLADDER: ICD-10-PCS | Mod: S$GLB,,, | Performed by: UROLOGY

## 2023-12-01 NOTE — PROGRESS NOTES
Pt here for DMSO #6.  States she has been feeling urgency. Anjali area cleaned with hibiclens and 4x4's. After donning sterile gloves, 14 FR catheter inserted without difficulty and urine obtained for U/A and bladder emptied.  Results negative.     Step 1:  Verification from pt of correct name on syringe.  60 CC syringe of Heparin/Dexamethasone mix instilled slowly.  Catheter plugged and secured with tape to inner left thigh. Pt instructed to change position from back to left side to right side every 10 minutes. Upon completion of 30 minutes, catheter plug removed and bladder drained of 125 cc clear yellow urine.  Step 2: RIMSO/2% Lidocaine gel mixture mixed and slowly instilled via catheter with piston syringe.  Catheter removed with tip in tact. Pt instructed to let mixture dwell for 20 minutes but no longer than 30 minutes then empty bladder.  Pt acknowledges understanding.

## 2023-12-01 NOTE — LETTER
December 1, 2023      [facility]  [address] [address]  Phone: [phone]  Fax: [phone]       Patient: Chelsea Damico   YOB: 1980  Date of Visit: 12/01/2023    To Whom It May Concern:    Vincent Damico  was at [facility] on 12/01/2023. The patient may return to work/school on 12/1/2023 with no restrictions. If you have any questions or concerns, or if I can be of further assistance, please do not hesitate to contact me.    Sincerely,    Melany Palafox RN

## 2023-12-04 ENCOUNTER — PATIENT MESSAGE (OUTPATIENT)
Dept: OBSTETRICS AND GYNECOLOGY | Facility: CLINIC | Age: 43
End: 2023-12-04
Payer: COMMERCIAL

## 2024-09-03 DIAGNOSIS — Z12.11 SCREENING FOR COLON CANCER: Primary | ICD-10-CM

## 2024-09-11 ENCOUNTER — PATIENT MESSAGE (OUTPATIENT)
Dept: UROLOGY | Facility: CLINIC | Age: 44
End: 2024-09-11
Payer: COMMERCIAL

## 2024-09-22 ENCOUNTER — TELEPHONE (OUTPATIENT)
Dept: GASTROENTEROLOGY | Facility: CLINIC | Age: 44
End: 2024-09-22

## 2024-09-22 VITALS — HEIGHT: 62 IN | WEIGHT: 133 LBS | BODY MASS INDEX: 24.48 KG/M2

## 2024-09-22 DIAGNOSIS — Z12.11 SCREENING FOR COLON CANCER: Primary | ICD-10-CM

## 2024-09-22 RX ORDER — BUPROPION HYDROCHLORIDE 150 MG/1
TABLET ORAL
COMMUNITY
Start: 2024-09-12

## 2024-09-22 RX ORDER — LISDEXAMFETAMINE DIMESYLATE 70 MG/1
70 CAPSULE ORAL
COMMUNITY
Start: 2024-09-04

## 2024-09-22 RX ORDER — FLUTICASONE PROPIONATE 50 MCG
SPRAY, SUSPENSION (ML) NASAL
COMMUNITY
Start: 2024-09-04

## 2024-09-22 RX ORDER — NORGESTIMATE AND ETHINYL ESTRADIOL 7DAYSX3 LO
1 KIT ORAL DAILY
COMMUNITY

## 2024-09-22 NOTE — TELEPHONE ENCOUNTER
----- Message from Philly Mcneill sent at 9/16/2024  9:32 AM CDT -----  Regarding: Direct Schedule - New Pt NBP  Contact: Melody/[facility]    ----- Message -----  From: Jude Fuller MA  Sent: 9/12/2024   3:19 PM CDT  To: Philly Mcneill  Subject: Referral Status                                    ----- Message -----  From: Nohemy Collins  Sent: 9/12/2024  10:31 AM CDT  To: Singh [facility] Staff    Melody with [facility] is calling to speak with someone regarding referral. Request to confirm if provider has received a referral sent to facility. Please give [facility] a call back at [phone] to assist.  Thank you,  GH

## 2024-09-22 NOTE — TELEPHONE ENCOUNTER
This will be patient's first colonoscopy.    Patient was advised to hold Vyvanse 2 days prior to procedure.    Patient denied having any current GI problems or issues. Also denied having any hx of seizures, sleep apnea, or kidney disease. Chart was reviewed and updated with patient. Prep instructions were also reviewed and sent to patient's email at [email or web address].    Colonoscopy scheduled on Tuesday February 25, 2025 w/ NBP at [facility]. DMP

## 2024-09-24 ENCOUNTER — OFFICE VISIT (OUTPATIENT)
Dept: UROLOGY | Facility: CLINIC | Age: 44
End: 2024-09-24
Payer: COMMERCIAL

## 2024-09-24 VITALS
BODY MASS INDEX: 24.48 KG/M2 | HEART RATE: 108 BPM | DIASTOLIC BLOOD PRESSURE: 92 MMHG | WEIGHT: 133 LBS | SYSTOLIC BLOOD PRESSURE: 161 MMHG | HEIGHT: 62 IN

## 2024-09-24 DIAGNOSIS — N30.10 IC (INTERSTITIAL CYSTITIS): Primary | ICD-10-CM

## 2024-09-24 PROCEDURE — 99214 OFFICE O/P EST MOD 30 MIN: CPT | Mod: S$GLB,,, | Performed by: UROLOGY

## 2024-09-24 PROCEDURE — 3077F SYST BP >= 140 MM HG: CPT | Mod: CPTII,S$GLB,, | Performed by: UROLOGY

## 2024-09-24 PROCEDURE — 3080F DIAST BP >= 90 MM HG: CPT | Mod: CPTII,S$GLB,, | Performed by: UROLOGY

## 2024-09-24 PROCEDURE — 3008F BODY MASS INDEX DOCD: CPT | Mod: CPTII,S$GLB,, | Performed by: UROLOGY

## 2024-09-24 PROCEDURE — 1159F MED LIST DOCD IN RCRD: CPT | Mod: CPTII,S$GLB,, | Performed by: UROLOGY

## 2024-09-24 NOTE — LETTER
September 24, 2024      [facility]  [address] [address]  Phone: [phone]  Fax: [phone]       Patient: Chelsea Damico   YOB: 1980  Date of Visit: 09/24/2024    To Whom It May Concern:    Vincent Damico  was at [facility] on 09/24/2024. The patient may return to work/school on 09/24/2024 with no restrictions. If you have any questions or concerns, or if I can be of further assistance, please do not hesitate to contact me.    Sincerely,    Jimmy Dolan MA

## 2024-09-24 NOTE — PROGRESS NOTES
Subjective:       Patient ID: Chelsea Damico is a 44 y.o. female.    Chief Complaint: No chief complaint on file.      HPI:  44-year-old female with history of interstitial cystitis. She has done fairly well for the past year but currently has some flare-ups what she feels related potentially to stress.  In the past, she received bladder instillations which improved her symptoms.    Past Medical History:   Past Medical History:   Diagnosis Date    ADD (attention deficit disorder)     Allergies     BMI 24.33 09/22/2024    H/O thoracic outlet syndrome     Mood disorder     Thoracic outlet syndrome     Thyroid disorder        Past Surgical Historical:   Past Surgical History:   Procedure Laterality Date    APPENDECTOMY      CARPAL TUNNEL RELEASE Right     CHOLECYSTECTOMY  2014    laparascopic      exploratory x 5 - endometriosis    SHOULDER SURGERY Right 05/2024    THORACIC OUTLET SURGERY  2010        Medications:   Medication List with Changes/Refills   Current Medications    BUPROPION (WELLBUTRIN XL) 150 MG TB24 TABLET    TAKE 1 TABLET ORALLY EVERY DAY    FLUTICASONE PROPIONATE (FLONASE) 50 MCG/ACTUATION NASAL SPRAY    Spray 2 sprays into both nostrils once a day    LISDEXAMFETAMINE (VYVANSE) 70 MG CAPSULE    Take 70 mg by mouth.    MONTELUKAST (SINGULAIR) 10 MG TABLET    TAKE 1 TABLET ORALLY EVERY DAY    NORGESTIMATE-ETHINYL ESTRADIOL (ORTHO TRI-CYCLEN LO) 0.18/0.215/0.25 MG-25 MCG TABLET    Take 1 tablet by mouth once daily.    SYNTHROID 100 MCG TABLET    Take 100 mcg by mouth once daily.        Past Social History:   Social History     Socioeconomic History    Marital status:    Tobacco Use    Smoking status: Never    Smokeless tobacco: Never   Substance and Sexual Activity    Alcohol use: Yes     Comment: very seldom    Drug use: Never    Sexual activity: Yes     Partners: Male     Birth control/protection: OCP       Allergies: Review of patient's allergies indicates:  No Known Allergies     Family History:    Family History   Problem Relation Name Age of Onset    No Known Problems Mother      Diabetes Father      Breast cancer Neg Hx      Colon cancer Neg Hx      Ovarian cancer Neg Hx      Uterine cancer Neg Hx      Cervical cancer Neg Hx      Prostate cancer Neg Hx      Pancreatic cancer Neg Hx      Melanoma Neg Hx          Review of Systems:  Review of Systems   Constitutional:  Negative for activity change and appetite change.   HENT:  Negative for congestion and dental problem.    Eyes:  Negative for pain and discharge.   Respiratory:  Negative for chest tightness and shortness of breath.    Cardiovascular:  Negative for chest pain.   Gastrointestinal:  Negative for abdominal distention and abdominal pain.   Endocrine: Negative for cold intolerance, heat intolerance and polyuria.   Genitourinary:  Negative for decreased urine volume, difficulty urinating, dyspareunia, dysuria, enuresis, flank pain, frequency, genital sores, hematuria, menstrual problem, pelvic pain, urgency, vaginal bleeding, vaginal discharge and vaginal pain.   Musculoskeletal:  Negative for back pain and neck pain.   Skin:  Negative for color change and rash.   Allergic/Immunologic: Negative for immunocompromised state.   Neurological:  Negative for dizziness.   Hematological:  Negative for adenopathy.   Psychiatric/Behavioral:  Negative for agitation, behavioral problems and confusion.        Physical Exam:  Physical Exam  Constitutional:       Appearance: She is well-developed.   HENT:      Head: Normocephalic and atraumatic.      Right Ear: External ear normal.      Left Ear: External ear normal.   Eyes:      Conjunctiva/sclera: Conjunctivae normal.   Neck:      Vascular: No JVD.   Cardiovascular:      Rate and Rhythm: Normal rate and regular rhythm.   Pulmonary:      Effort: Pulmonary effort is normal. No respiratory distress.      Breath sounds: Normal breath sounds. No wheezing.   Abdominal:      General: There is no distension.       Palpations: Abdomen is soft.      Tenderness: There is no abdominal tenderness. There is no rebound.   Musculoskeletal:         General: Normal range of motion.      Cervical back: Normal range of motion.   Skin:     General: Skin is warm and dry.      Findings: No erythema or rash.   Neurological:      Mental Status: She is alert and oriented to person, place, and time.   Psychiatric:         Behavior: Behavior normal.         Assessment/Plan:     Interstitial cystitis:  Unfortunately we do not provide DMSO instillations in clinic any longer.  We will send a referral to [facility] for this service.  Problem List Items Addressed This Visit    None  Visit Diagnoses       IC (interstitial cystitis)    -  Primary    Relevant Orders    Ambulatory referral/consult to Urology

## 2024-10-04 DIAGNOSIS — N30.10 INTERSTITIAL CYSTITIS: ICD-10-CM

## 2024-10-04 RX ORDER — NITROFURANTOIN 25; 75 MG/1; MG/1
100 CAPSULE ORAL DAILY
Qty: 30 CAPSULE | Refills: 11 | Status: SHIPPED | OUTPATIENT
Start: 2024-10-04

## 2024-10-11 RX ORDER — SOD SULF/POT CHLORIDE/MAG SULF 1.479 G
TABLET ORAL
Qty: 24 TABLET | Refills: 0 | Status: SHIPPED | OUTPATIENT
Start: 2024-10-11

## 2024-10-17 ENCOUNTER — PATIENT MESSAGE (OUTPATIENT)
Dept: UROLOGY | Facility: CLINIC | Age: 44
End: 2024-10-17
Payer: COMMERCIAL

## 2024-10-29 ENCOUNTER — TELEPHONE (OUTPATIENT)
Dept: UROLOGY | Facility: CLINIC | Age: 44
End: 2024-10-29
Payer: COMMERCIAL

## 2024-11-18 ENCOUNTER — TELEPHONE (OUTPATIENT)
Dept: OBSTETRICS AND GYNECOLOGY | Facility: CLINIC | Age: 44
End: 2024-11-18
Payer: COMMERCIAL

## 2024-11-18 NOTE — TELEPHONE ENCOUNTER
Called and spoke with the patient. She reports having ear pain and her PCP sent her to [facility] for a MRI - then sent to [facility] due to having 3 small blood clots in brain. They had discontinued her BC. She was on the BC for Endometriosis - now on blood thinners and bleeding and having pelvic pain.   Rescheduled patient's yearly for Friday with Dr. Painting to discuss her options.   Radha Martins

## 2024-11-18 NOTE — TELEPHONE ENCOUNTER
----- Message from [employer] sent at 11/18/2024  3:13 PM CST -----  Type:  Sooner Apoointment Request    Caller is requesting a sooner appointment.  Caller declined first available appointment listed below.  Caller will not accept being placed on the waitlist and is requesting a message be sent to doctor.  Name of Caller Chelsea Damico    When is the first available appointment? 01/2025  Symptoms:vaginal bleeding , pain ( was taken ofg BC medication due to Brain clotting ) [facility] sinus thrombosis   Would the patient rather a call back or a response via [facility]?   Best Call Back Number: [phone]    Additional Information: needs a sooner appt

## 2024-11-22 ENCOUNTER — OFFICE VISIT (OUTPATIENT)
Dept: OBSTETRICS AND GYNECOLOGY | Facility: CLINIC | Age: 44
End: 2024-11-22
Payer: COMMERCIAL

## 2024-11-22 VITALS
WEIGHT: 139.81 LBS | HEIGHT: 62 IN | BODY MASS INDEX: 25.73 KG/M2 | SYSTOLIC BLOOD PRESSURE: 155 MMHG | DIASTOLIC BLOOD PRESSURE: 94 MMHG | HEART RATE: 81 BPM

## 2024-11-22 DIAGNOSIS — Z12.4 SCREENING FOR CERVICAL CANCER: Primary | ICD-10-CM

## 2024-11-22 DIAGNOSIS — Z12.39 ENCOUNTER FOR SCREENING FOR MALIGNANT NEOPLASM OF BREAST, UNSPECIFIED SCREENING MODALITY: ICD-10-CM

## 2024-11-22 DIAGNOSIS — Z00.00 ENCOUNTER FOR WELLNESS EXAMINATION: ICD-10-CM

## 2024-11-22 DIAGNOSIS — Z30.9 ENCOUNTER FOR CONTRACEPTIVE MANAGEMENT, UNSPECIFIED TYPE: ICD-10-CM

## 2024-11-22 RX ORDER — METOPROLOL TARTRATE 25 MG/1
25 TABLET, FILM COATED ORAL DAILY PRN
COMMUNITY

## 2024-11-22 RX ORDER — CLONAZEPAM 0.5 MG/1
TABLET ORAL
COMMUNITY
Start: 2024-11-08

## 2024-11-22 RX ORDER — DEXTROAMPHETAMINE SACCHARATE, AMPHETAMINE ASPARTATE, DEXTROAMPHETAMINE SULFATE AND AMPHETAMINE SULFATE 7.5; 7.5; 7.5; 7.5 MG/1; MG/1; MG/1; MG/1
TABLET ORAL
COMMUNITY
Start: 2024-10-29

## 2024-11-22 NOTE — PROGRESS NOTES
Subjective     Patient ID: Chelsea Damico is a 44 y.o. female.    Chief Complaint:  Well Woman      History of Present Illness  HPI  This is a 44 year old female coming in for her annual exam. She also has complaints of needing a different type of contraception the patient had a clot in her brain this past week and was told she can not take estrogen containing contraception anymore     GYN & OB History  Patient's last menstrual period was 11/15/2024 (approximate).   Date of Last Pap: No result found    OB History    Para Term  AB Living   2       2     SAB IAB Ectopic Multiple Live Births   2              # Outcome Date GA Lbr Jaron/2nd Weight Sex Type Anes PTL Lv   2 2005           1 2003               Review of Systems  Review of Systems   Constitutional:  Negative for fatigue, fever and unexpected weight change.   Respiratory:  Negative for cough and shortness of breath.    Cardiovascular:  Negative for chest pain, palpitations and leg swelling.   Gastrointestinal:  Negative for abdominal pain, bloating, blood in stool, constipation, diarrhea, nausea and vomiting.   Genitourinary:  Positive for dysmenorrhea and menorrhagia. Negative for decreased libido, dyspareunia, dysuria, frequency, genital sores, hematuria, menstrual problem, pelvic pain, urgency, vaginal discharge, urinary incontinence and vaginal odor.   Musculoskeletal:  Negative for arthralgias and joint swelling.   Integumentary:  Negative for rash, mole/lesion, breast mass, nipple discharge, breast skin changes and breast tenderness.   Psychiatric/Behavioral: Negative.     Breast: Negative for asymmetry, lump, mass, nipple discharge, skin changes and tenderness         Objective   Physical Exam:   Constitutional: She appears well-developed and well-nourished.      Neck: No thyroid mass and no thyromegaly present.     Pulmonary/Chest: Chest wall is not dull to percussion. She exhibits no mass, no tenderness, no bony tenderness, no  laceration, no crepitus, no edema, no deformity, no swelling and no retraction. Right breast exhibits no inverted nipple, no mass, no nipple discharge, no skin change, no tenderness, presence, no bleeding and no swelling. Left breast exhibits no inverted nipple, no mass, no nipple discharge, no skin change, no tenderness, presence, no bleeding and no swelling.        Abdominal: Soft. Bowel sounds are normal. There is no abdominal tenderness. Hernia confirmed negative in the right inguinal area and confirmed negative in the left inguinal area.     Genitourinary:    Vagina and uterus normal.      Pelvic exam was performed with patient supine.     Labial bartholins normal.There is no rash, tenderness, lesion or injury on the right labia. There is no rash, tenderness, lesion or injury on the left labia. Cervix is normal. Right adnexum displays no mass, no tenderness and no fullness. Left adnexum displays no mass, no tenderness and no fullness. No rectocele, cystocele or prolapse of vaginal walls in the vagina.                Skin: Skin is warm and dry.    Psychiatric: She has a normal mood and affect. Her speech is normal and behavior is normal.       Chaperone present        Assessment and Plan     1. Screening for cervical cancer    2. Encounter for screening for malignant neoplasm of breast, unspecified screening modality    3. Encounter for contraceptive management, unspecified type    4. Encounter for wellness examination            Plan:  Screening for cervical cancer  -     Liquid-based pap smear, screening    Encounter for screening for malignant neoplasm of breast, unspecified screening modality  -     Mammo Digital Screening Bilat w/ Jonathan; Future; Expected date: 11/22/2024    Encounter for contraceptive management, unspecified type  -     Device Authorization Order    Encounter for wellness examination    We discussed her using a progesterone only contraceptive as she has a history of endometriosis this would  not put her at an increased risk for blood clotting and it would also help with her endometriosis at this point in her life.

## 2024-12-02 ENCOUNTER — PATIENT MESSAGE (OUTPATIENT)
Dept: OBSTETRICS AND GYNECOLOGY | Facility: CLINIC | Age: 44
End: 2024-12-02
Payer: COMMERCIAL

## 2024-12-02 DIAGNOSIS — Z30.430 ENCOUNTER FOR IUD INSERTION: Primary | ICD-10-CM

## 2024-12-09 RX ORDER — DIAZEPAM 10 MG/1
10 TABLET ORAL DAILY
Qty: 1 TABLET | Refills: 0 | Status: SHIPPED | OUTPATIENT
Start: 2024-12-09

## 2024-12-09 RX ORDER — MISOPROSTOL 200 UG/1
TABLET ORAL
Qty: 2 TABLET | Refills: 0 | Status: SHIPPED | OUTPATIENT
Start: 2024-12-09

## 2024-12-16 ENCOUNTER — PATIENT MESSAGE (OUTPATIENT)
Dept: OBSTETRICS AND GYNECOLOGY | Facility: CLINIC | Age: 44
End: 2024-12-16
Payer: COMMERCIAL

## 2025-01-13 ENCOUNTER — PATIENT MESSAGE (OUTPATIENT)
Dept: OBSTETRICS AND GYNECOLOGY | Facility: CLINIC | Age: 45
End: 2025-01-13
Payer: COMMERCIAL

## 2025-01-24 ENCOUNTER — TELEPHONE (OUTPATIENT)
Dept: OBSTETRICS AND GYNECOLOGY | Facility: CLINIC | Age: 45
End: 2025-01-24

## 2025-01-24 ENCOUNTER — PATIENT MESSAGE (OUTPATIENT)
Dept: OBSTETRICS AND GYNECOLOGY | Facility: CLINIC | Age: 45
End: 2025-01-24

## 2025-01-24 NOTE — TELEPHONE ENCOUNTER
----- Message from Med Assistant Castillo sent at 1/24/2025 10:44 AM CST -----  Regarding: FW: Medical Advice  Contact: Aranza    ----- Message -----  From: Carmelina Chen  Sent: 1/24/2025  10:43 AM CST  To: Garima Caceres Staff  Subject: Medical Advice                                   Type:  Needs Medical Advice    Who Called: Aranza   Symptoms (please be specific):    How long has patient had these symptoms:    Pharmacy name and phone #:    Would the patient rather a call back or a response via [facility]? call  Best Call Back Number:    Additional Information:  Aranza with Dr. Day is providing this phone number to call the provider on his cell phone. [phone]. Please give to Dr. Painting to call the provider today.

## 2025-02-18 ENCOUNTER — TELEPHONE (OUTPATIENT)
Dept: GASTROENTEROLOGY | Facility: CLINIC | Age: 45
End: 2025-02-18
Payer: COMMERCIAL

## 2025-02-18 VITALS — BODY MASS INDEX: 25.58 KG/M2 | HEIGHT: 62 IN | WEIGHT: 139 LBS

## 2025-02-18 DIAGNOSIS — Z12.11 SCREENING FOR COLON CANCER: Primary | ICD-10-CM

## 2025-02-18 RX ORDER — APIXABAN 5 MG/1
TABLET, FILM COATED ORAL
COMMUNITY
Start: 2025-02-05

## 2025-02-18 NOTE — TELEPHONE ENCOUNTER
S/w pt and told her that I was calling as a courtesy regarding up coming Colon with NB on 2/25/25, Tuesday and wanted to verify that she has her paper prep instructions and meds. Pt stated she has both. I reminded pt not to take Vyvanse 2 days before the procedure. I also asked if she is taking Eliquis. Pt stated yes, that she was diagnosed with a blood clot in her brain in November.  Dr. Rico Day - Hematologists, [facility] told her to hold Eliquis 2 days before the procedure and resume after the procedure is completed.  I also mentioned that [facility] will call the day before (MON) with the arrival time, GI Lab is located on the third floor, and to pre-register before next Monday. [facility]      Called Dr. Rico Day [phone] and s/w  to get fax number [phone]. Sent staff message to [facility] to see if she just wants last OV notes or clearance.  vira

## 2025-02-18 NOTE — LETTER
February 19, 2025        Rico Day MD  [address]  [address]  [facility] [address]             [facility]  [address] [address]  Phone: [phone]  Fax: [phone] Clearance Form    PLEASE PROVIDE ALL INFORMATION    Patient: Chelsea Damico   MR Number: [id number]   YOB: 1980   Date of Visit: 2/18/2025       Dr. Day,    I am writing to you for some assistance with a patient that is currently under your care.  Chelsea Damico, 1980, has been scheduled for a non-urgent for a Colonoscopy under General/MAC anesthesia with Dr. Lisa Winters on  2/25/25.    Dr. Winters is requesting approval to hold Eliquis for 1 day. Please sent the last office visit notes as well.      PLEASE FAX THIS CLEARANCE WITH LAST OFFICE VISIT NOTES [phone].    Thank you for your help in this matter.    Sincerely,    Barbra Romano  Phone- [phone]  Fax- [phone]

## 2025-02-18 NOTE — TELEPHONE ENCOUNTER
"[facility]  [address] [address]  Phone: [phone]  Fax: [phone]    History & Physical         Provider: Dr. Lisa Winters    Patient Name: Chelsea MCCARTHY (age):1980  44 y.o.           Gender: female   Phone: [phone]     Referring Physician: Anabel Deal     Vital Signs:   Height - 5' 2"  Weight - 139 lb  BMI -  25.42    Plan: Colonoscopy @ [facility]    Encounter Diagnosis   Name Primary?    Screening for colon cancer Yes           History:      Past Medical History:   Diagnosis Date    ADD (attention deficit disorder)     Allergies     Blood clots in brain     BMI 24.33 2024    H/O thoracic outlet syndrome     Mood disorder     Thyroid disorder       Past Surgical History:   Procedure Laterality Date    APPENDECTOMY      CARPAL TUNNEL RELEASE Right     CHOLECYSTECTOMY  2014    laparascopic      exploratory x 5 - endometriosis    SHOULDER SURGERY Right 2024    THORACIC OUTLET SURGERY        Medication List with Changes/Refills   Current Medications    BUPROPION (WELLBUTRIN XL) 150 MG TB24 TABLET    TAKE 1 TABLET ORALLY EVERY DAY    CLONAZEPAM (KLONOPIN) 0.5 MG TABLET    TAKE 1 TABLET ORALLY EVERY DAY    DEXTROAMPHETAMINE-AMPHETAMINE 30 MG TAB    TAKE 1 TABLET ORALLY TWICE DAILY    DIAZEPAM (VALIUM) 10 MG TAB    Take 1 tablet (10 mg total) by mouth once daily. Take 1 tablet 30 minutes prior to procedure.    FLUTICASONE PROPIONATE (FLONASE) 50 MCG/ACTUATION NASAL SPRAY    Spray 2 sprays into both nostrils once a day    METOPROLOL TARTRATE (LOPRESSOR) 25 MG TABLET    Take 25 mg by mouth daily as needed.    MISOPROSTOL (CYTOTEC) 200 MCG TAB    Take 1 tablet by mouth the night before procedure, then 1 tablet by mouth the morning of procedure.    MONTELUKAST (SINGULAIR) 10 MG TABLET    TAKE 1 TABLET ORALLY EVERY DAY    NITROFURANTOIN, MACROCRYSTAL-MONOHYDRATE, " (MACROBID) 100 MG CAPSULE    Take 1 capsule (100 mg total) by mouth Daily.    SOD SULF-POT CHLORIDE-MAG SULF (SUTAB) 1.479-0.188- 0.225 GRAM TABLET    Take according to package instructions with indicated amount of water. No breakfast day before test. May substitute with Suprep, Clenpiq, Plenvu, Moviprep or GoLytely based on Rx plan and patient preference.    SYNTHROID 100 MCG TABLET    Take 100 mcg by mouth once daily.      Review of patient's allergies indicates:  No Known Allergies   Family History   Problem Relation Name Age of Onset    No Known Problems Mother      Diabetes Father      Breast cancer Neg Hx      Colon cancer Neg Hx      Ovarian cancer Neg Hx      Uterine cancer Neg Hx      Cervical cancer Neg Hx      Prostate cancer Neg Hx      Pancreatic cancer Neg Hx      Melanoma Neg Hx        Social History[1]     Physical Examination:     General Appearance:___________________________  HEENT: _____________________________________  Abdomen:____________________________________  Heart:________________________________________  Lungs:_______________________________________  Extremities:___________________________________  Skin:_________________________________________  Endocrine:____________________________________  Genitourinary:_________________________________  Neurological:__________________________________      Patient has been evaluated immediately prior to sedation and is medically cleared for endoscopy with IVCS as an ASA class: ______      Physician Signature: _________________________       Date: ________  Time: ________                   [1]   Social History  Tobacco Use    Smoking status: Never    Smokeless tobacco: Never   Substance Use Topics    Alcohol use: Yes     Comment: very seldom    Drug use: Never

## 2025-02-19 NOTE — TELEPHONE ENCOUNTER
I reviewed the patient's hematologist's note in Epic from 12/2024.  Send clearance to hold Eliquis x1 day for nonurgent colonoscopy versus reschedule colonoscopy to 6 months after 11/2024 Eliquis initiation.  MELODY

## 2025-02-21 NOTE — TELEPHONE ENCOUNTER
"Rec'd cardiac clearance 2/20/25 and ov notes 12/13/24 from Dr. Day. "No contraindication fro colonoscopy from a hematologic standpoint would recommend holding Eliquis for 2 days prior to procedure and resume the day following the procedure". I have imported into chart and routed to NBP, MLC, and WILY.   "

## 2025-02-25 ENCOUNTER — OUTSIDE PLACE OF SERVICE (OUTPATIENT)
Dept: GASTROENTEROLOGY | Facility: CLINIC | Age: 45
End: 2025-02-25

## 2025-02-25 LAB
B-HCG UR QL: NEGATIVE
CRC RECOMMENDATION EXT: NORMAL

## 2025-02-26 ENCOUNTER — TELEPHONE (OUTPATIENT)
Dept: GASTROENTEROLOGY | Facility: CLINIC | Age: 45
End: 2025-02-26
Payer: COMMERCIAL

## 2025-02-26 NOTE — TELEPHONE ENCOUNTER
----- Message from Melissa sent at 2/26/2025  8:27 AM CST -----  Contact: BIRD JIMÉNEZ [[id number]]  ...Type:  Patient Requesting OrdersWho Called: BIRD JIMÉNEZ [[id number]]What order(s)is the call regarding?: pt needs a returning to work order. If you could sent it through the ap that would work.  Would the patient rather a call back or a response via [facility]?  call[facility] Call Back Number: [phone] (home) Additional Information:  [email or web address]

## 2025-02-26 NOTE — LETTER
February 26, 2025      [facility]  [address] [address]  Phone: [phone]  Fax: [phone]       Patient: Chelsea Damico   YOB: 1980  Date of Visit: 02/25/2025    To Whom It May Concern:    Vincent Damico  was at [facility] on 02/25/2025. The patient may return to work/school on 02/26/2025 with no restrictions. If you have any questions or concerns, or if I can be of further assistance, please do not hesitate to contact me.    Sincerely,    Josey Munoz MA

## 2025-03-06 ENCOUNTER — TELEPHONE (OUTPATIENT)
Dept: OBSTETRICS AND GYNECOLOGY | Facility: CLINIC | Age: 45
End: 2025-03-06
Payer: COMMERCIAL

## 2025-03-06 DIAGNOSIS — Z30.430 ENCOUNTER FOR IUD INSERTION: Primary | ICD-10-CM

## 2025-03-06 NOTE — TELEPHONE ENCOUNTER
Spoke with patient. Cancelled her yearly appt because she had one scheduled already. Also added ultrasound to iud insert due to patient not having any vaginal deliveries. Also advised patient to take an at home pregnancy test before taking the cytotec and sendind it to us and come with a full bladder to take another in office. Also to see us before going up to ultrasound.

## 2025-03-06 NOTE — TELEPHONE ENCOUNTER
----- Message from Fatou sent at 3/6/2025  9:02 AM CST -----  Type:  Needs Medical AdviceWho Called: Chelsea Newton (please be specific): - How long has patient had these symptoms:  -Pharmacy name and phone #:  -Would the patient rather a call back or a response via [facility]? Be Call Back Number: [phone] Information: pt would like to speak w/ nurse to clarify upcoming appt please call

## 2025-03-20 ENCOUNTER — PATIENT MESSAGE (OUTPATIENT)
Dept: OBSTETRICS AND GYNECOLOGY | Facility: CLINIC | Age: 45
End: 2025-03-20

## 2025-03-20 ENCOUNTER — PROCEDURE VISIT (OUTPATIENT)
Dept: OBSTETRICS AND GYNECOLOGY | Facility: CLINIC | Age: 45
End: 2025-03-20
Payer: COMMERCIAL

## 2025-03-20 VITALS
SYSTOLIC BLOOD PRESSURE: 130 MMHG | WEIGHT: 137.81 LBS | BODY MASS INDEX: 25.36 KG/M2 | DIASTOLIC BLOOD PRESSURE: 74 MMHG | HEIGHT: 62 IN | HEART RATE: 97 BPM

## 2025-03-20 DIAGNOSIS — N95.2 ATROPHIC VAGINITIS: ICD-10-CM

## 2025-03-20 DIAGNOSIS — N95.1 VASOMOTOR SYMPTOMS DUE TO MENOPAUSE: ICD-10-CM

## 2025-03-20 DIAGNOSIS — N91.2 AMENORRHEA: ICD-10-CM

## 2025-03-20 DIAGNOSIS — Z30.430 ENCOUNTER FOR IUD INSERTION: Primary | ICD-10-CM

## 2025-03-20 DIAGNOSIS — Z30.430 ENCOUNTER FOR IUD INSERTION: ICD-10-CM

## 2025-03-20 LAB
B-HCG UR QL: NEGATIVE
CTP QC/QA: YES

## 2025-03-20 PROCEDURE — 76998 US GUIDE INTRAOP: CPT | Mod: 26,S$PBB,, | Performed by: OBSTETRICS & GYNECOLOGY

## 2025-03-20 RX ORDER — EPINEPHRINE 0.3 MG/.3ML
INJECTION SUBCUTANEOUS
COMMUNITY
Start: 2024-12-02

## 2025-03-20 RX ORDER — BUTALBITAL, ACETAMINOPHEN AND CAFFEINE 300; 40; 50 MG/1; MG/1; MG/1
CAPSULE ORAL
COMMUNITY
Start: 2024-12-19

## 2025-03-20 NOTE — PROCEDURES
Insertion of IUD    Date/Time: 3/20/2025 10:30 AM    Performed by: Nicki Painting MD  Authorized by: Nicki Painting MD    Consent:     Consent given by:  Patient    Procedure risks and benefits discussed: yes      Patient questions answered: yes      Patient agrees, verbalizes understanding, and wants to proceed: yes      Educational handouts given: yes      Instructions and paperwork completed: yes    Insertion Procedure:   1 Intra Uterine Device levonorgestreL 52 mg       Pelvic exam performed: yes      Negative urine pregnancy test: yes      Cervix cleaned and prepped: yes      Speculum placed in vagina: yes      Tenaculum applied to cervix: yes      Uterus sounded: yes      IUD inserted with no complications: yes      IUD type:  Mirena    Strings trimmed: yes    Post-procedure:     Patient tolerated procedure well: yes      Patient will follow up after next period: yes    The patient had some cramping throughout the procedure I did stop several times to make sure the patient was okay.  I did have to use a small dilator in order to help get the IUD through her cervix.  This was done under ultrasound guidance.  To assure correct placement due to slight cervical stenosis.  The patient also complained of some vaginal atrophy and dryness complaints as well as some menopausal symptoms.  We are going to check an FSH level to see where she is as far as menopause.  However we decided to continue to place her IUD today as it may help with some of those symptoms.  She has not had a cycle since  stopping  pills back in December.

## 2025-03-28 ENCOUNTER — PATIENT MESSAGE (OUTPATIENT)
Dept: OBSTETRICS AND GYNECOLOGY | Facility: CLINIC | Age: 45
End: 2025-03-28
Payer: COMMERCIAL

## 2025-04-04 ENCOUNTER — PATIENT MESSAGE (OUTPATIENT)
Dept: OBSTETRICS AND GYNECOLOGY | Facility: CLINIC | Age: 45
End: 2025-04-04
Payer: COMMERCIAL

## 2025-04-04 NOTE — TELEPHONE ENCOUNTER
Spoke with the patient regarding her FSH being in the menopausal range.  She is with a Mirena.  This may help her with some of her menopausal symptoms I have also recommended the natural supplements which she has just ordered she is coming back to see me for a string check on the 28th so we will discuss her symptoms at that time we also discussed alternatives for hot flashes if this medication does not help her.

## 2025-04-06 ENCOUNTER — TELEPHONE (OUTPATIENT)
Dept: GASTROENTEROLOGY | Facility: CLINIC | Age: 45
End: 2025-04-06
Payer: COMMERCIAL

## 2025-04-06 NOTE — TELEPHONE ENCOUNTER
1 hyp, repeat colonoscopy in 5 years.      Notify patient. Confirm recall tab in appointment desk is up-to-date (update if needed).  Have pathology report scanned into this chart (with 2978 ) from [facility] portal.  NBP

## 2025-04-07 NOTE — TELEPHONE ENCOUNTER
Called pt and went over NBP chart remarks. Pt acknowledge she understood.  Verified that recall tab is up-to-date. Check [facility] portal and scanned report into media.  vira

## 2025-04-11 ENCOUNTER — DOCUMENTATION ONLY (OUTPATIENT)
Dept: GASTROENTEROLOGY | Facility: CLINIC | Age: 45
End: 2025-04-11
Payer: COMMERCIAL

## 2025-04-28 ENCOUNTER — OFFICE VISIT (OUTPATIENT)
Dept: OBSTETRICS AND GYNECOLOGY | Facility: CLINIC | Age: 45
End: 2025-04-28
Payer: COMMERCIAL

## 2025-04-28 VITALS
SYSTOLIC BLOOD PRESSURE: 144 MMHG | HEIGHT: 62 IN | HEART RATE: 97 BPM | WEIGHT: 139.19 LBS | DIASTOLIC BLOOD PRESSURE: 85 MMHG | BODY MASS INDEX: 25.62 KG/M2

## 2025-04-28 DIAGNOSIS — Z30.431 IUD CHECK UP: Primary | ICD-10-CM

## 2025-04-28 PROCEDURE — 99213 OFFICE O/P EST LOW 20 MIN: CPT | Mod: S$PBB,,, | Performed by: OBSTETRICS & GYNECOLOGY

## 2025-04-28 PROCEDURE — 3077F SYST BP >= 140 MM HG: CPT | Mod: CPTII,,, | Performed by: OBSTETRICS & GYNECOLOGY

## 2025-04-28 PROCEDURE — 1159F MED LIST DOCD IN RCRD: CPT | Mod: CPTII,,, | Performed by: OBSTETRICS & GYNECOLOGY

## 2025-04-28 PROCEDURE — 3008F BODY MASS INDEX DOCD: CPT | Mod: CPTII,,, | Performed by: OBSTETRICS & GYNECOLOGY

## 2025-04-28 PROCEDURE — 3079F DIAST BP 80-89 MM HG: CPT | Mod: CPTII,,, | Performed by: OBSTETRICS & GYNECOLOGY

## 2025-04-28 RX ORDER — METOPROLOL SUCCINATE 25 MG/1
TABLET, EXTENDED RELEASE ORAL
COMMUNITY
Start: 2025-04-02 | End: 2025-04-28

## 2025-04-28 RX ORDER — IPRATROPIUM BROMIDE 21 UG/1
SPRAY, METERED NASAL
COMMUNITY

## 2025-04-28 RX ORDER — METOPROLOL SUCCINATE 25 MG/1
TABLET, EXTENDED RELEASE ORAL
COMMUNITY
Start: 2024-12-30 | End: 2025-04-28

## 2025-04-28 RX ORDER — DIAZEPAM 10 MG/1
1 TABLET ORAL EVERY MORNING
COMMUNITY
Start: 2024-12-09 | End: 2025-04-28

## 2025-04-28 RX ORDER — CYCLOBENZAPRINE HCL 10 MG
1 TABLET ORAL DAILY
COMMUNITY
Start: 2024-11-25

## 2025-04-28 RX ORDER — TOBRAMYCIN 3 MG/ML
SOLUTION/ DROPS OPHTHALMIC
COMMUNITY
Start: 2024-12-19 | End: 2025-04-28

## 2025-04-28 RX ORDER — PREDNISOLONE ACETATE 10 MG/ML
SUSPENSION/ DROPS OPHTHALMIC
COMMUNITY
Start: 2025-03-17 | End: 2025-04-28

## 2025-04-28 NOTE — PROGRESS NOTES
Subjective     Patient ID: Chelsea Damico is a 45 y.o. female.    Chief Complaint:  Follow-up (1mo iud check)      History of Present Illness  Follow-up  Pertinent negatives include no abdominal pain, nausea or vomiting.     Patient presents today with complaints of string check.  The patient has not report any problems since her IUD was placed    GYN & OB History  No LMP recorded.   Date of Last Pap: 2024    OB History    Para Term  AB Living   2    2    SAB IAB Ectopic Multiple Live Births   2          # Outcome Date GA Lbr Jaron/2nd Weight Sex Type Anes PTL Lv   2 SAB            1 2003               Review of Systems  Review of Systems   Gastrointestinal:  Negative for abdominal pain, bloating, blood in stool, constipation, diarrhea, nausea, vomiting, reflux and fecal incontinence.   Genitourinary:  Negative for bladder incontinence, decreased libido, dysmenorrhea, dyspareunia, dysuria, flank pain, frequency, genital sores, hematuria, hot flashes, menorrhagia, menstrual problem, pelvic pain, urgency, vaginal bleeding, vaginal discharge, vaginal pain, urinary incontinence, postcoital bleeding, postmenopausal bleeding, vaginal dryness and vaginal odor.          Objective   Physical Exam:   Constitutional: Vital signs are normal. She appears well-developed and well-nourished.               Genitourinary:    Vagina and uterus normal.      Pelvic exam was performed with patient supine.     Labial bartholins normal.Cervix is normal. Right adnexum displays no mass, no tenderness and no fullness. Left adnexum displays no mass, no tenderness and no fullness. No erythema, vaginal discharge, tenderness, bleeding, rectocele, cystocele or prolapse of vaginal walls in the vagina.    No foreign body in the vagina.      No signs of injury in the vagina.   Cervix exhibits no motion tenderness, no discharge and no friability. IUD strings visualized.                         Assessment and Plan     1. IUD check  up            Plan:  IUD check up